# Patient Record
Sex: FEMALE | Race: WHITE | URBAN - METROPOLITAN AREA
[De-identification: names, ages, dates, MRNs, and addresses within clinical notes are randomized per-mention and may not be internally consistent; named-entity substitution may affect disease eponyms.]

---

## 2018-07-31 ENCOUNTER — OFFICE VISIT (OUTPATIENT)
Dept: DERMATOLOGY | Age: 28
End: 2018-07-31

## 2018-07-31 DIAGNOSIS — B86 SCABIES: Primary | ICD-10-CM

## 2018-07-31 PROCEDURE — 99203 OFFICE O/P NEW LOW 30 MIN: CPT | Performed by: DERMATOLOGY

## 2018-07-31 RX ORDER — PERMETHRIN 50 MG/G
CREAM TOPICAL
Qty: 60 G | Refills: 1 | Status: SHIPPED | OUTPATIENT
Start: 2018-07-31

## 2018-07-31 NOTE — PROGRESS NOTES
last week (hot water/high heat), vacuuming entire house  -Edu re: symptoms may persist for weeks following treatment  -Spoke w/ Dr. Dinorah Manley (director of nursing home) to update him of above